# Patient Record
Sex: FEMALE | Race: WHITE | URBAN - METROPOLITAN AREA
[De-identification: names, ages, dates, MRNs, and addresses within clinical notes are randomized per-mention and may not be internally consistent; named-entity substitution may affect disease eponyms.]

---

## 2017-10-04 ENCOUNTER — OFFICE VISIT (OUTPATIENT)
Dept: FAMILY MEDICINE CLINIC | Age: 37
End: 2017-10-04

## 2017-10-04 VITALS
HEART RATE: 76 BPM | BODY MASS INDEX: 27.32 KG/M2 | RESPIRATION RATE: 20 BRPM | SYSTOLIC BLOOD PRESSURE: 111 MMHG | TEMPERATURE: 98.9 F | HEIGHT: 65 IN | DIASTOLIC BLOOD PRESSURE: 75 MMHG | WEIGHT: 164 LBS | OXYGEN SATURATION: 100 %

## 2017-10-04 DIAGNOSIS — Z00.00 WELL ADULT EXAM: Primary | ICD-10-CM

## 2017-10-04 NOTE — PROGRESS NOTES
Muna Pope 44 y.o. female   Chief Complaint   Patient presents with    Complete Physical     Pt has pap done elsewhere with OBGYN.         1. Have you been to the ER, urgent care clinic since your last visit? Hospitalized since your last visit? No    2. Have you seen or consulted any other health care providers outside of the [address] [place] [address] since your last visit? Include any pap smears or colon screening.  No

## 2017-10-04 NOTE — PROGRESS NOTES
Subjective:   44 y.o. female for Well Woman Check. Her gyne and breast care is done elsewhere by her Ob-Gyne physician. Patient Active Problem List   Diagnosis Code    Vitamin D deficiency E55.9    Depression F32.9    PMDD (premenstrual dysphoric disorder) F32.81     Current Outpatient Prescriptions   Medication Sig Dispense Refill    cholecalciferol, VITAMIN D3, (VITAMIN D3) 5,000 unit tab tablet Take 5,000 Units by mouth daily.  OTHER Topical magnesium      Ferrous Gluconate 325 mg (36 mg iron) tab Take 325 mg by mouth daily.  TESTOSTERONE CREAM Apply 1 g to affected area daily. Testosterone Cream      LACTOBACILLUS ACIDOPHILUS (PROBIOTIC PO) Take 50 Caps by mouth.  PROGESTERONE 100 mg by SubLINGual route two (2) times a day. 1/2-1 ml twice daily on day 14 of cycle.  IODINE by Does Not Apply route.  ergocalciferol (ERGOCALCIFEROL) 50,000 unit capsule Take 1 Cap by mouth every seven (7) days. 8 Cap 5     No Known Allergies  Past Medical History:   Diagnosis Date    Depression      History reviewed. No pertinent surgical history. Family History   Problem Relation Age of Onset    Thyroid Disease Mother     Elevated Lipids Father     Cancer Maternal Grandmother      LUNG OR STOMACH    Cancer Paternal Grandmother      LUNG OR STOMACH     Social History   Substance Use Topics    Smoking status: Never Smoker    Smokeless tobacco: Never Used    Alcohol use Yes      Comment: WINE 2 OR LESS PER WEEK          ROS: Feeling generally well. No TIA's or unusual headaches, no dysphagia. No prolonged cough. No dyspnea or chest pain on exertion. No abdominal pain, change in bowel habits, black or bloody stools. No urinary tract symptoms. No new or unusual musculoskeletal symptoms. Specific concerns today: pt is seeing a holistic provider. She is using a testosterone supplement daily as well as an estrogen supplement during a certain portion of her cycle.   She is on an iodine supplement. She did have improvement of her depression. She notes that she has negative thoughts as a reactive issue and she will be doing cognitive behavioral therapy soon to address this. Pt has been told that her cortisol level is elevated and she should work on meditating to help lower her stress and reduce the cortisol level. They will recheck it in a few months. Objective: The patient appears well, alert, oriented x 3, in no distress. Visit Vitals    /75    Pulse 76    Temp 98.9 °F (37.2 °C) (Oral)    Resp 20    Ht 5' 5\" (1.651 m)    Wt 164 lb (74.4 kg)    SpO2 100%    BMI 27.29 kg/m2     General:  Alert, cooperative, no distress, appears stated age. Head:  Normocephalic, without obvious abnormality, atraumatic. Eyes:  Conjunctivae/corneas clear. PERRL, EOMs intact. Fundi benign. Ears:  Normal TMs and external ear canals both ears. Nose: Nares normal. Septum midline. Mucosa normal. No drainage or sinus tenderness. Throat: Lips, mucosa, and tongue normal. Teeth and gums normal.   Neck: Supple, symmetrical, trachea midline, no adenopathy, thyroid: no enlargement/tenderness/nodules, no carotid bruit and no JVD. Back:   Symmetric, no curvature. ROM normal. No CVA tenderness. Lungs:   Clear to auscultation bilaterally. Chest wall:  No tenderness or deformity. Heart:  Regular rate and rhythm, S1, S2 normal, no murmur, click, rub or gallop. Abdomen:   Soft, non-tender. Bowel sounds normal. No masses,  No organomegaly. Extremities: Extremities normal, atraumatic, no cyanosis or edema. Pulses: 2+ and symmetric all extremities. Skin: Skin color, texture, turgor normal. No rashes or lesions. Lymph nodes: Cervical and supraclavicular nodes normal.   Neurologic: CNII-XII intact. Normal strength, sensation and reflexes throughout. Assessment/Plan:   Well Woman  continue present plan  Diagnoses and all orders for this visit:    1.  Well adult exam